# Patient Record
Sex: FEMALE | Race: WHITE | NOT HISPANIC OR LATINO | Employment: FULL TIME | URBAN - METROPOLITAN AREA
[De-identification: names, ages, dates, MRNs, and addresses within clinical notes are randomized per-mention and may not be internally consistent; named-entity substitution may affect disease eponyms.]

---

## 2019-12-16 ENCOUNTER — HOSPITAL ENCOUNTER (EMERGENCY)
Facility: HOSPITAL | Age: 29
Discharge: HOME | End: 2019-12-16
Admitting: EMERGENCY MEDICINE

## 2019-12-16 VITALS
TEMPERATURE: 98.5 F | DIASTOLIC BLOOD PRESSURE: 85 MMHG | WEIGHT: 174.82 LBS | RESPIRATION RATE: 14 BRPM | OXYGEN SATURATION: 100 % | BODY MASS INDEX: 29.13 KG/M2 | SYSTOLIC BLOOD PRESSURE: 121 MMHG | HEIGHT: 65 IN | HEART RATE: 95 BPM

## 2019-12-16 DIAGNOSIS — T23.061A BURN OF BACK OF RIGHT HAND, UNSPECIFIED BURN DEGREE, INITIAL ENCOUNTER: Primary | ICD-10-CM

## 2019-12-16 DIAGNOSIS — M79.641 RIGHT HAND PAIN: ICD-10-CM

## 2019-12-16 PROCEDURE — 99283 EMERGENCY DEPT VISIT LOW MDM: CPT

## 2019-12-16 PROCEDURE — 99282 EMERGENCY DEPT VISIT SF MDM: CPT

## 2019-12-16 RX ORDER — ACETAMINOPHEN 500 MG
1000 TABLET ORAL ONCE
Status: DISCONTINUED | OUTPATIENT
Start: 2019-12-16 | End: 2019-12-16 | Stop reason: HOSPADM

## 2019-12-16 NOTE — ED NOTES
Pt c/o burn to right hand from soup about 1 hour ago. She states she applied lidocaine gel and Silvadene to right palm and between pointer & middle finger. Pt has ice pack applied to hand at this time. C/o pain to anterior & posterior hand. Denies numbness or tingling to hand, sensation present to hand.       Salome Aguirre, RN  12/16/19 [id number]

## 2019-12-16 NOTE — DISCHARGE INSTRUCTIONS
Reviewed burn care handout.  Use Silvadene as needed.  You may also soak your hand in cool water for 20 minutes at a time.  You may keep area covered with nonadherent dressing as needed    Use Tylenol as needed for pain.    Follow-up with your primary care provider in 3-5 days.  If you do not have a primary care provider call [phone] for help in finding one, or you may follow up with [facility] at [phone].    Return to ED for any new or worsening symptoms

## 2019-12-16 NOTE — ED NOTES
I applied silvadine to pt's wound/ put a telfa pad on it and wrapped it with a kerlex.      Miri Wills  12/16/19 8421

## 2019-12-16 NOTE — ED PROVIDER NOTES
Subjective   History of Present Illness  Patient is a 29-year-old female 13 weeks 2 days gestation presents with a burn of her right hand which occurred after she spilled soup on it about an hour ago.  Patient states she did put some topical Silvadene and lidocaine has been applying ice to the area with improvement of her pain.  Reports pain is worse with movement of her hand better with rest.  Currently rates as mild denies any radiation of the pain.  Denies any numbness paresthesias of her hand.  Denies any other alleviating or exacerbating factors.  Review of Systems   Constitutional: Negative.    Respiratory: Negative.    Cardiovascular: Negative.    Skin: Positive for color change and wound.   Neurological: Negative.        No past medical history on file.    Allergies   Allergen Reactions   • Influenza Virus Vacc Split Pf Swelling     MOUTH AND LIP SWELLING       No past surgical history on file.    No family history on file.    Social History     Socioeconomic History   • Marital status:      Spouse name: Not on file   • Number of children: Not on file   • Years of education: Not on file   • Highest education level: Not on file           Objective   Physical Exam   Constitutional: She is oriented to person, place, and time. She appears well-developed and well-nourished. No distress.   HENT:   Head: Normocephalic and atraumatic.   Eyes: Pupils are equal, round, and reactive to light. EOM are normal. No scleral icterus.   Cardiovascular: Normal rate, regular rhythm and normal heart sounds. Exam reveals no gallop and no friction rub.   No murmur heard.  Pulmonary/Chest: Effort normal and breath sounds normal. No stridor. She has no wheezes. She has no rales.   Neurological: She is alert and oriented to person, place, and time. No cranial nerve deficit or sensory deficit.   Skin: Skin is warm. Capillary refill takes less than 2 seconds. Burn noted. No rash noted. She is not diaphoretic.   First-degree burn  "noted along the palmar aspect of the right hand mostly noted on the second and third finger and in the second and third finger webspace.  Tenderness to palpation in this area    Symmetrically palpable radial and ulnar pulses. The flow with 2 seconds in all digits bilaterally.  Intact sensation to light touch radial median ulnar nerve demonstrated a testing in the dorsal webspace of the thumb, the distal palmar aspect of the index finger, in lateral surface of the 5th finger.  Two-point discrimination intact of the second and third right finger intact motor function of the radial median ulnar Strength with extension of a slight distal joint of the index finger, hand - present but decreasing noted pain, and spreading of the 2nd through 5th digits.  Intact recurrent medial nerve as demonstrated by thumb fully  Opposition, abduction and flexion.   Psychiatric: She has a normal mood and affect. Her behavior is normal.   Nursing note and vitals reviewed.      Procedures           ED Course    /85 (BP Location: Left arm, Patient Position: Sitting)   Pulse 95   Temp 98.5 °F (36.9 °C) (Oral)   Resp 14   Ht 165.1 cm (65\")   Wt 79.3 kg (174 lb 13.2 oz)   SpO2 100%   Breastfeeding No   BMI 29.09 kg/m²   Medications   acetaminophen (TYLENOL) tablet 1,000 mg (1,000 mg Oral Not Given 12/16/19 1244)   silver sulfadiazine (SILVADENE, SSD) 1 % cream 1 application (has no administration in time range)                       No data recorded                        MDM  Number of Diagnoses or Management Options  Burn of back of right hand, unspecified burn degree, initial encounter:   Right hand pain:   Diagnosis management comments: Chart Review:  Comorbidity: 13 weeks pregnant  Labs: Not warranted  Imaging: Was interpreted by physician and reviewed by myself: Not warranted  Disposition/Treatment:  While in the ED patient was afebrile she appeared nontoxic she was in no respiratory distress.  while in the ED given " Tylenol for her pain.  Patient is currently not in her third trimester pregnancy therefore patient was given Silvadene. findings were discussed with the patient and family at bedside who voiced understanding of discharge instructions along with signs and symptoms requiring return to the ED.  Upon discharged patient was in stable condition with followup for a revaluation.  She was neurologically intact no debridement was needed at this time.      Final diagnoses:   Burn of back of right hand, unspecified burn degree, initial encounter   Right hand pain              Mark Bingham PA  12/16/19 8090

## 2025-01-15 ENCOUNTER — OFFICE VISIT (OUTPATIENT)
Dept: URGENT CARE | Age: 35
End: 2025-01-15
Payer: COMMERCIAL

## 2025-01-15 VITALS
HEART RATE: 94 BPM | SYSTOLIC BLOOD PRESSURE: 132 MMHG | TEMPERATURE: 98.5 F | DIASTOLIC BLOOD PRESSURE: 87 MMHG | OXYGEN SATURATION: 98 % | RESPIRATION RATE: 16 BRPM

## 2025-01-15 DIAGNOSIS — J01.90 ACUTE NON-RECURRENT SINUSITIS, UNSPECIFIED LOCATION: Primary | ICD-10-CM

## 2025-01-15 PROCEDURE — 99203 OFFICE O/P NEW LOW 30 MIN: CPT | Performed by: PHYSICIAN ASSISTANT

## 2025-01-15 PROCEDURE — 99070 SPECIAL SUPPLIES PHYS/QHP: CPT | Performed by: PHYSICIAN ASSISTANT

## 2025-01-15 PROCEDURE — 1036F TOBACCO NON-USER: CPT | Performed by: PHYSICIAN ASSISTANT

## 2025-01-15 RX ORDER — AMOXICILLIN AND CLAVULANATE POTASSIUM 875; 125 MG/1; MG/1
1 TABLET, FILM COATED ORAL 2 TIMES DAILY
Qty: 20 TABLET | Refills: 0 | Status: SHIPPED | OUTPATIENT
Start: 2025-01-15 | End: 2025-01-22

## 2025-01-15 NOTE — PROGRESS NOTES
Subjective   Patient ID: Erlinda Drew is a 34 y.o. female. They present today with a chief complaint of sinus congestion (Started 1/6/25 and tried otc flu relief.  ).    CC: URI symptoms x 9 days    HPI: Patient presenting for runny nose, coughing, congestion.  Symptoms have been progressive and worsening.  No trismus or drooling.  No difficulty swallowing.  No chest pain, shortness of breath, abdominal pain, nausea, vomiting, diarrhea, rash.    Past Medical History  Allergies as of 01/15/2025    (No Known Allergies)       (Not in a hospital admission)       History reviewed. No pertinent past medical history.    History reviewed. No pertinent surgical history.     reports that she has never smoked. She has never used smokeless tobacco.    Review of Systems  Review of Systems    After reviewing all body systems I have documented pertinent findings above in the history.  All other Systems reviewed and are negative for complaint.  Pertinent positive and negatives are listed in the above HPI.    Objective    Vitals:    01/15/25 0932   BP: 132/87   Pulse: 94   Resp: 16   Temp: 36.9 °C (98.5 °F)   SpO2: 98%     No LMP recorded.    Physical Exam    General: Alert, oriented, and cooperative.  No acute distress. Well developed, well nourished.     Skin: Skin is warm, and dry. No rashes or lesions.    Eyes: Sclera and conjunctivae normal     Ears: TM´s are intact, pink, with slight effusions bilaterally.  No hemotympanum or rupture. Bilateral auricle, helix, and tragus without inflammation or erythema.  No mastoid erythema, or tenderness.  No deformities. Right and left canal negative for erythema, or discharge.  Hearing is grossly intact bilaterally    Mouth/Throat: Pharynx is erythematous.  Tonsils are equal in size.  No exudates.  No angioedema of the lips or tongue.  No respiratory compromise, tripoding, drooling, muffled voice,  stridor, or trismus.     Neck: Supple.     Cardiac: Regular rate and  rhythm    Respiratory:  No acute respiratory distress.  Regular rate of breathing.  No accessory muscle use.  No tripoding.  Lungs are clear bilaterally.      Procedures    Point of Care Test & Imaging Results from this visit  No results found for this or any previous visit.  No results found.    Diagnostic study results (if any) were reviewed by Cezar Gaxiola PA-C.    Assessment/Plan   Allergies, medications, history, and pertinent labs/EKGs/Imaging reviewed by Cezar Gaxiola PA-C.       MDM:  Patient presenting for sinus congestion, and pressure x 9 days.    Symptoms have been prolonged, and worsening.  Patient does not appear toxic.   No acute distress or respiratory compromise.    No tripoding. No nasal flaring.    No oral lesions, drooling, stridor, or angioedema.   Neck is supple without meningeal signs. Lungs clear.    No clinical signs or history to suggest meningitis, Huber´s, peritonsillar abscess, epiglottitis, pneumonia, or asthma.     -Symptoms lasting longer than expected, worsening sinus pressure  -Prescribed antibiotic therapy.  -Advised supportive therapy with over-the-counter cold medications such as antihistamines and decongestants.     -Advised to follow-up with PCP in the next 5 to 7 days especially if no improvement or resolution of symptoms.  -Pt/family instructed to return to the urgent care or emergency department if symptoms worsen or if new symptoms develop.    Orders and Diagnoses  Diagnoses and all orders for this visit:  Acute non-recurrent sinusitis, unspecified location  -     amoxicillin-pot clavulanate (Augmentin) 875-125 mg tablet; Take 1 tablet by mouth 2 times a day for 7 days.      Medical Admin Record      Patient disposition: Home    Electronically signed by Cezar Gaxiola PA-C  9:48 AM

## 2025-01-15 NOTE — PATIENT INSTRUCTIONS
You were seen for cold like symptoms.  You most likely have a bacterial sinus infection   I recommend supportive therapy in addition to the antibiotic prescribed    If not contraindicated by another medical condition or medication-you can try supportive therapy with the medications below.  These medications aim to help reduce symptoms.    1.  You can take Tylenol every 6 hours as needed for fever.  You can alternate with ibuprofen every 6 hours.  2.  Take antibiotic as prescribed.  3.  Use can use Sudafed for nasal congestion       You can use Flonase for nasal congestion and sinus pressure  4.  Stay well-hydrated and drink lots of fluids.  5.  Follow-up:  Follow up with your primary care physician in 2-3 days.  6.  Return to ED if symptoms worsen or new symptoms develop.    Sinusitis:  Sinusitis is inflammation of the sinuses, most commonly caused by a viral or bacterial infection or by an allergy.  ° Some of the most common symptoms of sinusitis are pain, tenderness, nasal congestion, and headache.  ° The diagnosis is based on symptoms, but sometimes a computed tomography scan or other imaging tests are needed.  ° Antibiotics can eliminate an underlying bacterial infection.  Sinusitis is one of the most common medical conditions. Sinusitis may occur in any of the four groups of sinuses: maxillary, ethmoid, frontal, or sphenoid. Sinusitis nearly always occurs in conjunction with inflammation of the nasal passages (rhinitis), and some doctors refer to the disorder as rhinosinusitis. It may be acute (short-lived) or chronic (long-standing).    ° Treatment to improve sinus drainage  ° Medicated nasal sprays  ° Sometimes antibiotics      Treatment of acute sinusitis is aimed at improving sinus drainage and curing the infection. Steam inhalation; hot, wet towels over the affected sinuses; and hot beverages may help relieve the swollen membranes and promote drainage. Flushing a saltwater solution through the nose  (nasal irrigation) or using a salt-water spray also can help symptoms.     Nasal sprays, such as phenylephrine or oxymetazoline, which cause swollen membranes to shrink, can be used for a limited time. Similar drugs, such as pseudoephedrine, taken by mouth are not as effective. Corticosteroid nasal sprays also can help relieve symptoms but take at least 10 days to work.    Antibiotics  For acute sinusitis that is severe (3 or more days of symptoms such as fever of 102.2º F [39º C] or higher and severe pain) or persistent (for 10 or more days), antibiotics such as amoxicillin/clavulanate or doxycycline are given.

## 2025-01-22 ENCOUNTER — APPOINTMENT (OUTPATIENT)
Dept: OBSTETRICS AND GYNECOLOGY | Facility: CLINIC | Age: 35
End: 2025-01-22
Payer: COMMERCIAL

## 2025-01-23 ENCOUNTER — APPOINTMENT (OUTPATIENT)
Dept: OBSTETRICS AND GYNECOLOGY | Facility: CLINIC | Age: 35
End: 2025-01-23
Payer: COMMERCIAL

## 2025-01-31 ENCOUNTER — OFFICE VISIT (OUTPATIENT)
Dept: URGENT CARE | Age: 35
End: 2025-01-31
Payer: COMMERCIAL

## 2025-01-31 ENCOUNTER — ANCILLARY PROCEDURE (OUTPATIENT)
Dept: URGENT CARE | Age: 35
End: 2025-01-31
Payer: COMMERCIAL

## 2025-01-31 VITALS
SYSTOLIC BLOOD PRESSURE: 128 MMHG | OXYGEN SATURATION: 99 % | DIASTOLIC BLOOD PRESSURE: 89 MMHG | RESPIRATION RATE: 16 BRPM | HEART RATE: 75 BPM | TEMPERATURE: 98 F

## 2025-01-31 DIAGNOSIS — R06.02 SHORTNESS OF BREATH: ICD-10-CM

## 2025-01-31 DIAGNOSIS — J40 BRONCHITIS: Primary | ICD-10-CM

## 2025-01-31 PROCEDURE — 71046 X-RAY EXAM CHEST 2 VIEWS: CPT | Performed by: PHYSICIAN ASSISTANT

## 2025-01-31 RX ORDER — BENZONATATE 200 MG/1
200 CAPSULE ORAL 3 TIMES DAILY PRN
Qty: 21 CAPSULE | Refills: 0 | Status: SHIPPED | OUTPATIENT
Start: 2025-01-31 | End: 2025-02-07

## 2025-01-31 RX ORDER — METHYLPREDNISOLONE 4 MG/1
TABLET ORAL
Qty: 21 TABLET | Refills: 0 | Status: SHIPPED | OUTPATIENT
Start: 2025-01-31 | End: 2025-02-06

## 2025-01-31 NOTE — PROGRESS NOTES
Subjective   Patient ID: Erlinda Drew is a 34 y.o. female. They present today with a chief complaint of URI (X 3 weeks).    History of Present Illness    URI    34-year-old patient presents to clinic with complaints of worsening productive cough with associated nasal congestion, shortness of breath with cough, rhinorrhea, postnasal drip for the past 3 weeks which was initially diagnosed as a sinus infection and treated with Augmentin with relief of sinus pain.  Reports has also  tried Advil cold and flu, sinus rinses, Tylenol, ibuprofen without relief.  Reports has tried Mucinex with some relief.Denies chest pain, dizziness, fevers, chills, body aches, nausea, vomiting, abdominal pain, diarrhea.       Past Medical History  Allergies as of 01/31/2025    (No Known Allergies)       (Not in a hospital admission)       No past medical history on file.    No past surgical history on file.     reports that she has never smoked. She has never used smokeless tobacco.    Review of Systems  Review of Systems     ROS negative with the exception as noted on HPI                           Objective    Vitals:    01/31/25 1512   BP: 128/89   Pulse: 75   Resp: 16   Temp: 36.7 °C (98 °F)   SpO2: 99%     No LMP recorded.    Physical Exam  Constitutional:       Appearance: Normal appearance.   HENT:      Head: Normocephalic and atraumatic.      Right Ear: Tympanic membrane, ear canal and external ear normal.      Left Ear: Tympanic membrane, ear canal and external ear normal.      Nose: Mucosal edema (and erythema) present. No rhinorrhea.      Right Sinus: No maxillary sinus tenderness or frontal sinus tenderness.      Left Sinus: No maxillary sinus tenderness or frontal sinus tenderness.      Mouth/Throat:      Lips: Pink.      Mouth: Mucous membranes are moist. No oral lesions.      Dentition: Normal dentition. No gingival swelling.      Tongue: No lesions. Tongue does not deviate from midline.      Palate: No mass and  lesions.      Pharynx: Postnasal drip present. No pharyngeal swelling, posterior oropharyngeal erythema or uvula swelling.   Cardiovascular:      Rate and Rhythm: Normal rate and regular rhythm.      Heart sounds: No murmur heard.  Pulmonary:      Effort: Pulmonary effort is normal. No respiratory distress.      Breath sounds: No stridor. Wheezing present. No rhonchi or rales.   Lymphadenopathy:      Cervical: Cervical adenopathy present.   Neurological:      Mental Status: She is alert.         Procedures    Point of Care Test & Imaging Results from this visit  No results found for this visit on 01/31/25.   XR chest 2 views    Result Date: 1/31/2025  Interpreted By:  Estephania Alas, STUDY: XR CHEST 2 VIEWS;  1/31/2025 3:38 pm   INDICATION: Signs/Symptoms:shortness of breath.   ,R06.02 Shortness of breath   COMPARISON: None.   ACCESSION NUMBER(S): [id number]   ORDERING CLINICIAN: CAROL MORENO   FINDINGS: PA and lateral radiographs of the chest were provided.       CARDIOMEDIASTINAL SILHOUETTE: Cardiomediastinal silhouette is normal in size and configuration.   LUNGS: Lungs are clear, without evidence of consolidation or pleural effusion.   ABDOMEN: No remarkable upper abdominal findings.   BONES: No acute osseous changes.       1.  No evidence of acute cardiopulmonary process.       MACRO: None   Signed by: Estephania Alas 1/31/2025 4:03 PM Dictation workstation:   [id number]     Diagnostic study results (if any) were reviewed by Carol Moreno PA-C.    Assessment/Plan   Allergies, medications, history, and pertinent labs/EKGs/Imaging reviewed by Carol Moreno PA-C.   worsening productive cough with associated nasal congestion, shortness of breath with cough, rhinorrhea, postnasal drip for the past 3 weeks which was initially diagnosed as a sinus infection and treated with Augmentin with relief of sinus pain.   Medrol Dosepak started for wheezing.   Patient is advised to avoid  NSAIDs while taking oral steroids.  Tessalon Perles started for cough. Advised to drink plenty of fluids, run a cool-mist humidifier in room at night, and get plenty of rest. Pt. is advised to take 10 deep breaths and hours and continue to walk around every couple of hours. Patient should avoid over-exertion and reduce exposure to irritants such as smoke, cold, dry air, and dust. Patient may take acetaminophen as directed to reduce fever and body aches. Risk, benefits, and potential side effects of medication(s) discussed with pt. Discussed disease/illness presentation, treatment options, progression, complications, and outcomes with patient. Pt. Has expressed understanding and is an agreement of plan of care.    Medical Decision Making      Orders and Diagnoses  Diagnoses and all orders for this visit:  Bronchitis  -     methylPREDNISolone (Medrol Dospak) 4 mg tablets; Follow schedule on package instructions  -     benzonatate (Tessalon) 200 mg capsule; Take 1 capsule (200 mg) by mouth 3 times a day as needed for cough for up to 7 days. Do not crush or chew.  Shortness of breath  -     XR chest 2 views; Future      Medical Admin Record      Patient disposition: Home    Electronically signed by Carol Arredondo PA-C  4:18 PM

## 2025-02-19 ENCOUNTER — APPOINTMENT (OUTPATIENT)
Dept: OBSTETRICS AND GYNECOLOGY | Facility: CLINIC | Age: 35
End: 2025-02-19
Payer: COMMERCIAL

## 2025-03-05 ENCOUNTER — APPOINTMENT (OUTPATIENT)
Dept: OBSTETRICS AND GYNECOLOGY | Facility: CLINIC | Age: 35
End: 2025-03-05
Payer: COMMERCIAL

## 2025-03-05 VITALS
HEIGHT: 66 IN | DIASTOLIC BLOOD PRESSURE: 85 MMHG | WEIGHT: 166.4 LBS | SYSTOLIC BLOOD PRESSURE: 126 MMHG | BODY MASS INDEX: 26.74 KG/M2

## 2025-03-05 DIAGNOSIS — Z30.432 ENCOUNTER FOR IUD REMOVAL: ICD-10-CM

## 2025-03-05 DIAGNOSIS — Z01.419 WELL WOMAN EXAM WITH ROUTINE GYNECOLOGICAL EXAM: Primary | ICD-10-CM

## 2025-03-05 PROCEDURE — 87624 HPV HI-RISK TYP POOLED RSLT: CPT

## 2025-03-05 RX ORDER — CETIRIZINE HYDROCHLORIDE 5 MG/1
5 TABLET ORAL DAILY
COMMUNITY

## 2025-03-05 NOTE — PROGRESS NOTES
"GYNECOLOGY PROGRESS NOTE        CC:    Chief Complaint   Patient presents with    Annual Exam    iud removal         HPI:  Erlinda Drew is here for a routine GYN examination.   Menses monthly lasting 7 days, no menometrorrhagia.  Sexually active.   Has paraguard in place.  Wants remoed.  3 prior 's, just PEC with first.  No h/o abnormal PAP, fibroids, endometriosis, ovarian cysts.  Taking PNV.  Would like to conceive.     OB History    Para Term  AB Living   4 3 3   1 2   SAB IAB Ectopic Multiple Live Births   1       2      # Outcome Date GA Lbr Emerson/2nd Weight Sex Type Anes PTL Lv   4 Term 2024 38w6d    Vag-Spont   NOVA   3 SAB 2023           2 Term 2020 39w0d    Vag-Spont      1 Term 2018 38w0d    Vag-Spont   NOVA      Complications: Preeclampsia ([facility])       ROS:    GI - no hematochezia/constipation/diarrhea  URO - no hematuria/dysuria/urinary frequency  GYN - no vaginal discharge/dyspareunia/dysmenorrhea/pelvic pain  PSYCH - mood OK    PMH:   Past Medical History:   Diagnosis Date    H/O pre-eclampsia     Seasonal allergies        PSH:   Past Surgical History:   Procedure Laterality Date    HIP ARTHROSCOPY W/ LABRAL REPAIR      3 surgerys last        Soc:   Social History     Social History Narrative    Not on file     Occupation/education:RN, stay at home mom,  advanced GI fellow at [employer]  Activity/hobbies: living in [facility] Hx:   Family History   Problem Relation Name Age of Onset    Diabetes Mother      Hypertension Mother      Hypertension Father           PHYSICAL EXAM:  /85 (BP Location: Right arm, Patient Position: Sitting)   Ht 1.676 m (5' 6\")   Wt 75.5 kg (166 lb 6.4 oz)   LMP 2024 (Exact Date)   BMI 26.86 kg/m²   GEN:  A&O, NAD  HEENT  head NC/AT, conjunctiva clear, no visible goiter  CV:  regular pulse rate and rhythm, no visible JVD  RESP:  symmetric respirations, no audible wheezing  ABD:  NT/ND, soft, no palpable masses  URO:  normal " urethra, no bladder TTP  GYN:  normal vulva and perineum w/o lesions or ulcers, normal vagina without discharge or lesions, normal cervix without lesions or discharge or CMT, uterus NT/NE, retroverted strings at os, adnexa mobile and NT/NE  BREAST:  no masses or TTP, no skin lesions or nipple discharge  DERM:  no hirsutism or acne   PSYCH:  normal affect, non-anxious    IUD Removal    Performed by: Rossana CRUZ MD  Authorized by: Rossana CRUZ MD    Procedure: IUD removal    Consent obtained by patient, parent, or legal power of  - including discussion of procedure risks and benefits, patient questions answered, and patient education provided: yes    Reason for removal: patient request    Strings visualized: yes    Tenaculum applied to cervix: no    IUD grasped by forceps: yes    Performed with ultrasound guidance: no    IUD removed: yes    Date/Time of Removal:  3/5/2025 10:10 AM  Removed without complications: yes    IUD intact: yes    Cervix manually dilated: no          IMPRESSION/PLAN:  Problem List Items Addressed This Visit    None  Visit Diagnoses       Well woman exam with routine gynecological exam    -  Primary    Relevant Orders    THINPREP PAP TEST (>30)             Normal exam, recommended continue PNV with iron.  Discussed healthy lifestyle.  Pap performed.        Rossana Connor MD
